# Patient Record
Sex: MALE | Race: WHITE | NOT HISPANIC OR LATINO | Employment: FULL TIME | URBAN - NONMETROPOLITAN AREA
[De-identification: names, ages, dates, MRNs, and addresses within clinical notes are randomized per-mention and may not be internally consistent; named-entity substitution may affect disease eponyms.]

---

## 2018-08-03 ENCOUNTER — TRANSCRIBE ORDERS (OUTPATIENT)
Dept: ADMINISTRATIVE | Facility: HOSPITAL | Age: 58
End: 2018-08-03

## 2018-08-03 ENCOUNTER — HOSPITAL ENCOUNTER (OUTPATIENT)
Dept: GENERAL RADIOLOGY | Facility: HOSPITAL | Age: 58
Discharge: HOME | End: 2018-08-03
Admitting: FAMILY MEDICINE

## 2018-08-03 DIAGNOSIS — S69.91XA RIGHT WRIST INJURY, INITIAL ENCOUNTER: ICD-10-CM

## 2018-08-03 DIAGNOSIS — M25.531 RIGHT WRIST PAIN: ICD-10-CM

## 2018-08-03 DIAGNOSIS — M25.431 WRIST SWELLING, RIGHT: Primary | ICD-10-CM

## 2018-08-03 PROCEDURE — 73110 X-RAY EXAM OF WRIST: CPT | Performed by: RADIOLOGY

## 2018-08-03 PROCEDURE — 73110 X-RAY EXAM OF WRIST: CPT

## 2018-08-06 ENCOUNTER — OFFICE VISIT (OUTPATIENT)
Dept: ORTHOPEDIC SURGERY | Facility: CLINIC | Age: 58
End: 2018-08-06

## 2018-08-06 VITALS
HEART RATE: 78 BPM | SYSTOLIC BLOOD PRESSURE: 133 MMHG | BODY MASS INDEX: 21 KG/M2 | WEIGHT: 150 LBS | DIASTOLIC BLOOD PRESSURE: 87 MMHG | HEIGHT: 71 IN

## 2018-08-06 DIAGNOSIS — S62.111A TRIQUETRAL CHIP FRACTURE, RIGHT, CLOSED, INITIAL ENCOUNTER: Primary | ICD-10-CM
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
## 2018-08-06 PROCEDURE — 25630 CLTX CARPL FX W/O MNPJ EA B1: CPT | Performed by: ORTHOPAEDIC SURGERY

## 2018-08-06 NOTE — PROGRESS NOTES
New Patient Visit      Patient: Cesar Castanon  YOB: 1960  Date of Encounter: 08/06/2018      HPI:   Cesar Castanon, 58 y.o. male, referred by Mikael Rose MD for right wrist injury, work-related, sustained on 08/02/2018.  Initially evaluated at [facility].  Radiographs obtained the day of his injury August 2 of 2018 were interpreted as fracture and he was referred for orthopedic evaluation.  He reports no other injuries with this fall, sustained no head injury, did not experience loss consciousness or any vision problems.  He has been in a cockup brace.  He reported for work today but was told not to work and to come for orthopedic evaluation.  He had no hand or wrist problems prior to this fall.    Active Problem List:  There is no problem list on file for this patient.      Past Medical History:  No past medical history on file.    Past Surgical History:  Past Surgical History:   Procedure Laterality Date   • APPENDECTOMY     • BACK SURGERY     • HERNIA REPAIR     • SHOULDER SURGERY Left     RCR   • TONSILLECTOMY AND ADENOIDECTOMY         Family History:  Family History   Problem Relation Age of Onset   • Cancer Mother    • Diabetes Mother    • Hypertension Mother    • Heart disease Father        Social History:  Social History     Social History   • Marital status:      Spouse name: N/A   • Number of children: N/A   • Years of education: N/A     Occupational History   • Not on file.     Social History Main Topics   • Smoking status: Current Every Day Smoker   • Smokeless tobacco: Never Used   • Alcohol use Yes      Comment: SOCIAL   • Drug use: Unknown   • Sexual activity: Defer     Other Topics Concern   • Not on file     Social History Narrative   • No narrative on file     Body mass index is 20.92 kg/m².    I advised Cesar of the risks of continuing to use tobacco, and I provided him with tobacco cessation educational materials in the After Visit Summary.     During this  "visit, I spent 3 minutes counseling the patient regarding tobacco cessation.    Medications:  No current outpatient prescriptions on file.     No current facility-administered medications for this visit.        Allergies:  Allergies   Allergen Reactions   • Bactrim [Sulfamethoxazole-Trimethoprim] Other (See Comments)     Patient states BP drops       Review of Systems:   Review of Systems   Constitutional: Negative.    HENT: Negative.    Eyes: Negative.    Respiratory: Negative.    Cardiovascular: Negative.    Gastrointestinal: Negative.    Endocrine: Negative.    Genitourinary: Negative.    Musculoskeletal: Positive for arthralgias.   Skin: Negative.    Allergic/Immunologic: Negative.    Neurological: Negative.    Hematological: Negative.    Psychiatric/Behavioral: Negative.        Physical Exam:   Physical Exam  GENERAL: 58 y.o. male, alert and oriented X 3 in no acute distress.   Visit Vitals  /87   Pulse 78   Ht 180.3 cm (71\")   Wt 68 kg (150 lb)   BMI 20.92 kg/m²     Musculoskeletal: Hand and wrist evaluation reveals moderate swelling with localized area of tenderness over the dorsal ulnar aspect of his wrist.  He has associated pain with attempted range of motion especially full flexion.  Volar tenderness no tenderness within the anatomic snuffbox.  He can move all fingers against resistance and has normal sensation.  He has mild pain with pronation and supination.  Neurovascular is grossly intact.     Radiology/Labs:   Radiographs were reviewed [facility] reveal avulsion off the dorsal aspect of the carpus.  It is reported as avulsion fracture off the triquetrum.      Assessment & Plan:  58 y.o. male with injury to right wrist work-related sustained 5 days ago and clinical exam supporting radiographs of fracture of the triquetrum.  Today he is placed in a long forearm brace.  He is placed off work until Monday, June 13, 2018 and he is to return light duty, restricted from lifting greater " than 10 pounds and no repetitive use of right arm.  He will continue with his brace, removing for bathing purposes and follow up in 6 weeks' time.      ICD-10-CM ICD-9-CM   1. Triquetral chip fracture, right, closed, initial encounter S62.111A 814.03   2. Work related injury Y99.0 959.9       Cc:   Mikael Rose MD          Scribed for Bret Espinoza MD by Elham Espinoza RN.3:52 PM 08/06/2018

## 2018-08-10 RX ORDER — SULFAMETHOXAZOLE AND TRIMETHOPRIM 800; 160 MG/1; MG/1
1 TABLET ORAL 2 TIMES DAILY
COMMUNITY
End: 2018-08-10

## 2018-09-17 ENCOUNTER — HOSPITAL ENCOUNTER (OUTPATIENT)
Dept: GENERAL RADIOLOGY | Facility: HOSPITAL | Age: 58
Discharge: HOME | End: 2018-09-17
Attending: ORTHOPAEDIC SURGERY | Admitting: ORTHOPAEDIC SURGERY

## 2018-09-17 ENCOUNTER — OFFICE VISIT (OUTPATIENT)
Dept: ORTHOPEDIC SURGERY | Facility: CLINIC | Age: 58
End: 2018-09-17

## 2018-09-17 VITALS — HEIGHT: 71 IN

## 2018-09-17 DIAGNOSIS — S62.111A TRIQUETRAL CHIP FRACTURE, RIGHT, CLOSED, INITIAL ENCOUNTER: ICD-10-CM

## 2018-09-17 DIAGNOSIS — S62.101D CLOSED FRACTURE OF RIGHT WRIST WITH ROUTINE HEALING, SUBSEQUENT ENCOUNTER: ICD-10-CM

## 2018-09-17 DIAGNOSIS — S62.101D CLOSED FRACTURE OF RIGHT WRIST WITH ROUTINE HEALING, SUBSEQUENT ENCOUNTER: Primary | ICD-10-CM

## 2018-09-17 PROCEDURE — 73110 X-RAY EXAM OF WRIST: CPT

## 2018-09-17 PROCEDURE — 73110 X-RAY EXAM OF WRIST: CPT | Performed by: RADIOLOGY

## 2018-09-17 PROCEDURE — 99024 POSTOP FOLLOW-UP VISIT: CPT | Performed by: ORTHOPAEDIC SURGERY

## 2018-09-17 NOTE — PROGRESS NOTES
Follow-up Visit         Patient: Cesar Castanon  YOB: 1960  Date of Encounter: 09/17/2018      Chief  Complaint:   Chief Complaint   Patient presents with   • Right Wrist - Pain, Follow-up         HPI:  Cesar Castanon, 58 y.o. male returns today in follow-up work-related injury August 2, 2018 with fracture involving the dorsal aspect of his wrist described is a triquetral chip fracture.  He is light duty at work.  He is restricted his activities he feels that he significantly improved.  He continues to wear his brace.    Medical History:  Patient Active Problem List   Diagnosis   • Triquetral chip fracture, right, closed, initial encounter     No past medical history on file.    Social History:  Social History     Social History   • Marital status:      Spouse name: N/A   • Number of children: N/A   • Years of education: N/A     Occupational History   • Not on file.     Social History Main Topics   • Smoking status: Current Every Day Smoker   • Smokeless tobacco: Never Used   • Alcohol use Yes      Comment: SOCIAL   • Drug use: Unknown   • Sexual activity: Defer     Other Topics Concern   • Not on file     Social History Narrative   • No narrative on file       Surgical History:  Past Surgical History:   Procedure Laterality Date   • APPENDECTOMY     • BACK SURGERY     • HERNIA REPAIR     • SHOULDER SURGERY Left     RCR   • TONSILLECTOMY AND ADENOIDECTOMY         Radiology:   X-Ray Right wrist by my review shows small avulsion fracture off the dorsal aspect of the triquetrum.  It is acceptably aligned and unchanged    Examination:   Right wrist evaluation reveals mild tenderness over the dorsal ulnar aspect of his wrist.  He has near full mobility.  Neurovascular grossly intact.    Assessment & Plan:   58 y.o. male doing well with fracture involving the dorsal wrist triquetral fracture.  He will continue wearing his wrist brace as needed.  He will be released to full duty work and we  will do final evaluation in 8 weeks' time.         Diagnosis Plan   1. Closed fracture of right wrist with routine healing, subsequent encounter  XR Wrist 3+ View Right   2. Triquetral chip fracture, right, closed, initial encounter               Cc:  Mikael Rose MD      Scribed for Bret Espinoza MD by Elham Espinzoa RN.1:38 PM 09/17/2018

## 2018-09-18 PROBLEM — S62.111A TRIQUETRAL CHIP FRACTURE, RIGHT, CLOSED, INITIAL ENCOUNTER: Status: ACTIVE | Noted: 2018-09-18

## 2019-11-26 ENCOUNTER — HOSPITAL ENCOUNTER (EMERGENCY)
Facility: HOSPITAL | Age: 59
Discharge: HOME | End: 2019-11-26
Attending: EMERGENCY MEDICINE | Admitting: EMERGENCY MEDICINE

## 2019-11-26 ENCOUNTER — APPOINTMENT (OUTPATIENT)
Dept: GENERAL RADIOLOGY | Facility: HOSPITAL | Age: 59
End: 2019-11-26

## 2019-11-26 VITALS
WEIGHT: 155 LBS | BODY MASS INDEX: 22.19 KG/M2 | SYSTOLIC BLOOD PRESSURE: 138 MMHG | OXYGEN SATURATION: 99 % | RESPIRATION RATE: 18 BRPM | TEMPERATURE: 97.6 F | DIASTOLIC BLOOD PRESSURE: 76 MMHG | HEIGHT: 70 IN | HEART RATE: 81 BPM

## 2019-11-26 DIAGNOSIS — S61.220A LACERATION OF RIGHT INDEX FINGER WITH FOREIGN BODY WITHOUT DAMAGE TO NAIL, INITIAL ENCOUNTER: Primary | ICD-10-CM

## 2019-11-26 PROCEDURE — 73130 X-RAY EXAM OF HAND: CPT | Performed by: RADIOLOGY

## 2019-11-26 PROCEDURE — 73130 X-RAY EXAM OF HAND: CPT

## 2019-11-26 PROCEDURE — 99283 EMERGENCY DEPT VISIT LOW MDM: CPT

## 2019-11-26 RX ORDER — CEPHALEXIN 500 MG/1
500 CAPSULE ORAL 4 TIMES DAILY
Qty: 28 CAPSULE | Refills: 0 | Status: SHIPPED | OUTPATIENT
Start: 2019-11-26 | End: 2019-12-03

## 2019-11-26 RX ORDER — LIDOCAINE HYDROCHLORIDE 20 MG/ML
10 INJECTION, SOLUTION INFILTRATION; PERINEURAL ONCE
Status: COMPLETED | OUTPATIENT
Start: 2019-11-26 | End: 2019-11-26

## 2019-11-26 RX ADMIN — LIDOCAINE HYDROCHLORIDE 10 ML: 20 INJECTION, SOLUTION INFILTRATION; PERINEURAL at 16:35
